# Patient Record
Sex: MALE | Race: WHITE | NOT HISPANIC OR LATINO | Employment: OTHER | URBAN - METROPOLITAN AREA
[De-identification: names, ages, dates, MRNs, and addresses within clinical notes are randomized per-mention and may not be internally consistent; named-entity substitution may affect disease eponyms.]

---

## 2023-11-30 ENCOUNTER — TELEPHONE (OUTPATIENT)
Dept: ADMISSION | Facility: HOSPITAL | Age: 73
End: 2023-11-30
Payer: MEDICARE

## 2023-11-30 DIAGNOSIS — C90.00 MULTIPLE MYELOMA, REMISSION STATUS UNSPECIFIED (MULTI): ICD-10-CM

## 2023-11-30 NOTE — TELEPHONE ENCOUNTER
"Patient was prescribed paxlovid and z pack Wednesday morning.     Patient is scheduled as a new patient for Dr. Mckinnon inquiring if he can keep appt this Friday 12/1/2023 with covid infection.     Per patient spouse \"doesn't have symptoms what so ever.\"   "

## 2023-12-01 ENCOUNTER — APPOINTMENT (OUTPATIENT)
Dept: HEMATOLOGY/ONCOLOGY | Facility: HOSPITAL | Age: 73
End: 2023-12-01
Payer: MEDICARE

## 2023-12-04 ENCOUNTER — TELEPHONE (OUTPATIENT)
Dept: HEMATOLOGY/ONCOLOGY | Facility: HOSPITAL | Age: 73
End: 2023-12-04
Payer: MEDICARE

## 2023-12-04 DIAGNOSIS — C90.00 MULTIPLE MYELOMA, REMISSION STATUS UNSPECIFIED (MULTI): ICD-10-CM

## 2023-12-04 NOTE — TELEPHONE ENCOUNTER
Call returned to patient in regards to rescheduling missed appt. Friday 12/1/23. Patient has been rescheduled for Friday 1/19/23 at 11am and was advised to get labs drawn 1 week prior to visit. Patient will have labs drawn at [facility]. Appointment Information will be mailed to patient. Patient verbalized no further needs noted and was made aware to call office with any additional questions.

## 2023-12-07 PROBLEM — Z86.79 ELECTRICAL ISOLATION OF LEFT ATRIAL APPENDAGE AFTER CARDIAC ABLATION PROCEDURE FOR ATRIAL FIBRILLATION: Status: ACTIVE | Noted: 2023-12-07

## 2023-12-07 PROBLEM — Z94.84 STEM CELLS TRANSPLANT STATUS (MULTI): Status: ACTIVE | Noted: 2023-12-07

## 2023-12-07 PROBLEM — I51.9 LEFT VENTRICULAR DYSFUNCTION: Status: ACTIVE | Noted: 2023-12-07

## 2023-12-07 PROBLEM — C90.00 MULTIPLE MYELOMA (MULTI): Status: ACTIVE | Noted: 2023-12-07

## 2023-12-07 PROBLEM — I48.19 PERSISTENT ATRIAL FIBRILLATION (MULTI): Status: ACTIVE | Noted: 2023-12-07

## 2023-12-07 PROBLEM — Z98.890 ELECTRICAL ISOLATION OF LEFT ATRIAL APPENDAGE AFTER CARDIAC ABLATION PROCEDURE FOR ATRIAL FIBRILLATION: Status: ACTIVE | Noted: 2023-12-07

## 2023-12-07 RX ORDER — POMALIDOMIDE 2 MG/1
CAPSULE ORAL
COMMUNITY

## 2023-12-07 RX ORDER — CHOLECALCIFEROL (VITAMIN D3) 50 MCG
1 TABLET ORAL EVERY OTHER DAY
COMMUNITY

## 2023-12-07 RX ORDER — DEXAMETHASONE 4 MG/1
5 TABLET ORAL
COMMUNITY

## 2023-12-07 RX ORDER — PNV NO.95/FERROUS FUM/FOLIC AC 28MG-0.8MG
1 TABLET ORAL EVERY OTHER DAY
COMMUNITY

## 2023-12-07 RX ORDER — ACYCLOVIR 400 MG/1
1 TABLET ORAL 2 TIMES DAILY
COMMUNITY

## 2023-12-07 RX ORDER — OMEPRAZOLE 20 MG/1
20 CAPSULE, DELAYED RELEASE ORAL DAILY
COMMUNITY

## 2023-12-12 ENCOUNTER — OFFICE VISIT (OUTPATIENT)
Dept: CARDIOLOGY | Facility: CLINIC | Age: 73
End: 2023-12-12
Payer: MEDICARE

## 2023-12-12 VITALS
BODY MASS INDEX: 27.6 KG/M2 | HEIGHT: 75 IN | WEIGHT: 222 LBS | DIASTOLIC BLOOD PRESSURE: 72 MMHG | HEART RATE: 80 BPM | SYSTOLIC BLOOD PRESSURE: 118 MMHG

## 2023-12-12 DIAGNOSIS — I48.19 PERSISTENT ATRIAL FIBRILLATION (MULTI): ICD-10-CM

## 2023-12-12 DIAGNOSIS — I51.9 LEFT VENTRICULAR DYSFUNCTION: ICD-10-CM

## 2023-12-12 DIAGNOSIS — E66.3 OVERWEIGHT (BMI 25.0-29.9): ICD-10-CM

## 2023-12-12 DIAGNOSIS — Z98.890 ELECTRICAL ISOLATION OF LEFT ATRIAL APPENDAGE AFTER CARDIAC ABLATION PROCEDURE FOR ATRIAL FIBRILLATION: Primary | ICD-10-CM

## 2023-12-12 DIAGNOSIS — Z86.79 ELECTRICAL ISOLATION OF LEFT ATRIAL APPENDAGE AFTER CARDIAC ABLATION PROCEDURE FOR ATRIAL FIBRILLATION: Primary | ICD-10-CM

## 2023-12-12 PROCEDURE — 1036F TOBACCO NON-USER: CPT | Performed by: INTERNAL MEDICINE

## 2023-12-12 PROCEDURE — 99214 OFFICE O/P EST MOD 30 MIN: CPT | Performed by: INTERNAL MEDICINE

## 2023-12-12 PROCEDURE — 1159F MED LIST DOCD IN RCRD: CPT | Performed by: INTERNAL MEDICINE

## 2023-12-12 RX ORDER — WARFARIN 6 MG/1
6 TABLET ORAL
COMMUNITY

## 2023-12-12 RX ORDER — AMOXICILLIN 250 MG
1 CAPSULE ORAL
COMMUNITY

## 2023-12-12 RX ORDER — ACETAMINOPHEN 500 MG
500 TABLET ORAL EVERY 6 HOURS PRN
COMMUNITY

## 2023-12-12 RX ORDER — MULTIVITAMIN/IRON/FOLIC ACID 18MG-0.4MG
1 TABLET ORAL EVERY OTHER DAY
COMMUNITY
Start: 2023-02-20

## 2023-12-12 RX ORDER — ALBUTEROL SULFATE 90 UG/1
2 AEROSOL, METERED RESPIRATORY (INHALATION) AS NEEDED
COMMUNITY
Start: 2023-06-23

## 2023-12-12 NOTE — LETTER
"December 12, 2023     Jose Salcido MD  [address]  [facility] [address]    Patient: Satish Hammer   YOB: 1950   Date of Visit: 12/12/2023       Dear Dr. Jose Salcido MD:    Thank you for referring Satish Hammer to me for evaluation. Below are my notes for this consultation.  If you have questions, please do not hesitate to call me. I look forward to following your patient along with you.       Sincerely,     Parvez Johnson MD      CC: No Recipients  ______________________________________________________________________________________    Subjective   Satish Hammer is a 73 y.o. male       Chief Complaint    Annual Exam          HPI     Patient returns in follow-up of problems as noted.  He is doing well.  Despite persistent and/or chronic atrial fibrillation he denies any angina CHF or arrhythmia symptomatology and has had no decrement in his aerobic capacity over the last year.  He understands that his previous ablation of atrial fibrillation ultimately failed but he is content with his current condition because his aerobic tolerance is good and he has no complaints or symptoms.    Previously he had some left ventricular dysfunction on echo but it is minimal and asymptomatic and because of this we do not feel that changes or adjustments in therapy are necessary.    In light of all the above we suggest continued therapy as before without change and follow-up next year        Review of Systems   All other systems reviewed and are negative.         Visit Vitals  Ht 1.905 m (6' 3\")   Wt 101 kg (222 lb)   BMI 27.75 kg/m²   Smoking Status Never   BSA 2.31 m²        Objective   Physical Exam  Constitutional:       Appearance: Normal appearance. He is normal weight.   HENT:      Nose: Nose normal.   Neck:      Vascular: No carotid bruit.   Cardiovascular:      Rate and Rhythm: Normal rate.      Pulses: Normal pulses.      Heart sounds: Normal heart sounds.   Pulmonary:      Effort: Pulmonary " effort is normal.   Abdominal:      General: Bowel sounds are normal.      Palpations: Abdomen is soft.   Genitourinary:     Rectum: Normal.   Musculoskeletal:         General: Normal range of motion.      Cervical back: Normal range of motion.      Right lower leg: No edema.      Left lower leg: No edema.   Skin:     General: Skin is warm and dry.   Neurological:      General: No focal deficit present.      Mental Status: He is alert.   Psychiatric:         Mood and Affect: Mood normal.         Behavior: Behavior normal.         Thought Content: Thought content normal.         Judgment: Judgment normal.         Current Medications    Current Outpatient Medications:   •  acetaminophen (Tylenol) 500 mg tablet, Take 1 tablet (500 mg) by mouth every 6 hours if needed for mild pain (1 - 3)., Disp: , Rfl:   •  acyclovir (Zovirax) 400 mg tablet, Take 1 tablet (400 mg) by mouth 2 times a day., Disp: , Rfl:   •  albuterol 90 mcg/actuation inhaler, Inhale 2 puffs if needed., Disp: , Rfl:   •  b complex 0.4 mg tablet, 1 tablet every other day., Disp: , Rfl:   •  calcium carbonate-vitamin D3 (Oscal-500) 500 mg-10 mcg (400 unit) tablet, Take 1 tablet by mouth every other day., Disp: , Rfl:   •  cholecalciferol (Vitamin D-3) 50 MCG (2000 UT) tablet, Take 1 tablet (2,000 Units) by mouth every other day., Disp: , Rfl:   •  dexAMETHasone (Decadron) 4 mg tablet, Take 5 tablets (20 mg) by mouth. Take 5 tablets by mouth 1 hour before treatment, Disp: , Rfl:   •  FILGRASTIM INJ, every 30 (thirty) days., Disp: , Rfl:   •  fish oil concentrate (Omega-3) 120-180 mg capsule, Take 1 capsule (1 g) by mouth once daily., Disp: , Rfl:   •  omeprazole (PriLOSEC) 20 mg DR capsule, Take 1 capsule (20 mg) by mouth once daily., Disp: , Rfl:   •  Pomalyst 2 mg capsule, Take by mouth.  Take one tablet by mouth daily. 3 weeks on and 1 week off, Disp: , Rfl:   •  sennosides-docusate sodium (Jayne-Colace) 8.6-50 mg tablet, Take 1 tablet by mouth once  daily., Disp: , Rfl:   •  warfarin (Coumadin) 6 mg tablet, Take 1 tablet (6 mg) by mouth. Take as directed by Dr. Salcido, Disp: , Rfl:   •  ZINC GLUCONATE ORAL, 140 mg once daily., Disp: , Rfl:                      Assessment/Plan   1. Electrical isolation of left atrial appendage after cardiac ablation procedure for atrial fibrillation  With brief success with none recurrence of atrial fibrillation.  The patient has no desire to undergo repeat ablation    2. Left ventricular dysfunction  Mild in nature and not necessitating restoration of maintenance of sinus rhythm or other adjustments in medical therapy    3. Persistent atrial fibrillation (CMS/HCC)  Asymptomatic well-tolerated and no impairment of aerobic capacity.  Risk is mitigated by chronic warfarin therapy    4. Overweight (BMI 25.0-29.9)  The merits of diet and weight loss were advocated

## 2023-12-12 NOTE — PROGRESS NOTES
"Subjective   Satish Hammer is a 73 y.o. male       Chief Complaint    Annual Exam          HPI     Patient returns in follow-up of problems as noted.  He is doing well.  Despite persistent and/or chronic atrial fibrillation he denies any angina CHF or arrhythmia symptomatology and has had no decrement in his aerobic capacity over the last year.  He understands that his previous ablation of atrial fibrillation ultimately failed but he is content with his current condition because his aerobic tolerance is good and he has no complaints or symptoms.    Previously he had some left ventricular dysfunction on echo but it is minimal and asymptomatic and because of this we do not feel that changes or adjustments in therapy are necessary.    In light of all the above we suggest continued therapy as before without change and follow-up next year        Review of Systems   All other systems reviewed and are negative.         Visit Vitals  Ht 1.905 m (6' 3\")   Wt 101 kg (222 lb)   BMI 27.75 kg/m²   Smoking Status Never   BSA 2.31 m²        Objective   Physical Exam  Constitutional:       Appearance: Normal appearance. He is normal weight.   HENT:      Nose: Nose normal.   Neck:      Vascular: No carotid bruit.   Cardiovascular:      Rate and Rhythm: Normal rate.      Pulses: Normal pulses.      Heart sounds: Normal heart sounds.   Pulmonary:      Effort: Pulmonary effort is normal.   Abdominal:      General: Bowel sounds are normal.      Palpations: Abdomen is soft.   Genitourinary:     Rectum: Normal.   Musculoskeletal:         General: Normal range of motion.      Cervical back: Normal range of motion.      Right lower leg: No edema.      Left lower leg: No edema.   Skin:     General: Skin is warm and dry.   Neurological:      General: No focal deficit present.      Mental Status: He is alert.   Psychiatric:         Mood and Affect: Mood normal.         Behavior: Behavior normal.         Thought Content: Thought content normal.   "       Judgment: Judgment normal.         Current Medications    Current Outpatient Medications:     acetaminophen (Tylenol) 500 mg tablet, Take 1 tablet (500 mg) by mouth every 6 hours if needed for mild pain (1 - 3)., Disp: , Rfl:     acyclovir (Zovirax) 400 mg tablet, Take 1 tablet (400 mg) by mouth 2 times a day., Disp: , Rfl:     albuterol 90 mcg/actuation inhaler, Inhale 2 puffs if needed., Disp: , Rfl:     b complex 0.4 mg tablet, 1 tablet every other day., Disp: , Rfl:     calcium carbonate-vitamin D3 (Oscal-500) 500 mg-10 mcg (400 unit) tablet, Take 1 tablet by mouth every other day., Disp: , Rfl:     cholecalciferol (Vitamin D-3) 50 MCG (2000 UT) tablet, Take 1 tablet (2,000 Units) by mouth every other day., Disp: , Rfl:     dexAMETHasone (Decadron) 4 mg tablet, Take 5 tablets (20 mg) by mouth. Take 5 tablets by mouth 1 hour before treatment, Disp: , Rfl:     FILGRASTIM INJ, every 30 (thirty) days., Disp: , Rfl:     fish oil concentrate (Omega-3) 120-180 mg capsule, Take 1 capsule (1 g) by mouth once daily., Disp: , Rfl:     omeprazole (PriLOSEC) 20 mg DR capsule, Take 1 capsule (20 mg) by mouth once daily., Disp: , Rfl:     Pomalyst 2 mg capsule, Take by mouth.  Take one tablet by mouth daily. 3 weeks on and 1 week off, Disp: , Rfl:     sennosides-docusate sodium (Jayne-Colace) 8.6-50 mg tablet, Take 1 tablet by mouth once daily., Disp: , Rfl:     warfarin (Coumadin) 6 mg tablet, Take 1 tablet (6 mg) by mouth. Take as directed by Dr. Salcido, Disp: , Rfl:     ZINC GLUCONATE ORAL, 140 mg once daily., Disp: , Rfl:                      Assessment/Plan   1. Electrical isolation of left atrial appendage after cardiac ablation procedure for atrial fibrillation  With brief success with none recurrence of atrial fibrillation.  The patient has no desire to undergo repeat ablation    2. Left ventricular dysfunction  Mild in nature and not necessitating restoration of maintenance of sinus rhythm or other adjustments in  medical therapy    3. Persistent atrial fibrillation (CMS/HCC)  Asymptomatic well-tolerated and no impairment of aerobic capacity.  Risk is mitigated by chronic warfarin therapy    4. Overweight (BMI 25.0-29.9)  The merits of diet and weight loss were advocated

## 2024-01-08 ENCOUNTER — LAB (OUTPATIENT)
Dept: LAB | Facility: LAB | Age: 74
End: 2024-01-08
Payer: MEDICARE

## 2024-01-08 DIAGNOSIS — C90.00 MULTIPLE MYELOMA, REMISSION STATUS UNSPECIFIED (MULTI): ICD-10-CM

## 2024-01-08 LAB
ALBUMIN SERPL BCP-MCNC: 4.2 G/DL (ref 3.4–5)
ALP SERPL-CCNC: 60 U/L (ref 33–136)
ALT SERPL W P-5'-P-CCNC: 15 U/L (ref 10–52)
ANION GAP SERPL CALC-SCNC: 7 MMOL/L (ref 10–20)
AST SERPL W P-5'-P-CCNC: 15 U/L (ref 9–39)
B2 MICROGLOB SERPL-MCNC: 2.4 MG/L (ref 0.7–2.2)
BASOPHILS # BLD AUTO: 0.03 X10*3/UL (ref 0–0.1)
BASOPHILS NFR BLD AUTO: 0.8 %
BILIRUB SERPL-MCNC: 0.7 MG/DL (ref 0–1.2)
BUN SERPL-MCNC: 12 MG/DL (ref 6–23)
CALCIUM SERPL-MCNC: 8.7 MG/DL (ref 8.6–10.3)
CHLORIDE SERPL-SCNC: 104 MMOL/L (ref 98–107)
CO2 SERPL-SCNC: 33 MMOL/L (ref 21–32)
CREAT SERPL-MCNC: 0.96 MG/DL (ref 0.5–1.3)
EGFRCR SERPLBLD CKD-EPI 2021: 83 ML/MIN/1.73M*2
EOSINOPHIL # BLD AUTO: 0.07 X10*3/UL (ref 0–0.4)
EOSINOPHIL NFR BLD AUTO: 1.8 %
ERYTHROCYTE [DISTWIDTH] IN BLOOD BY AUTOMATED COUNT: 17.5 % (ref 11.5–14.5)
GLUCOSE SERPL-MCNC: 103 MG/DL (ref 74–99)
HCT VFR BLD AUTO: 42.8 % (ref 41–52)
HGB BLD-MCNC: 14 G/DL (ref 13.5–17.5)
IGA SERPL-MCNC: 33 MG/DL (ref 70–400)
IGG SERPL-MCNC: 671 MG/DL (ref 700–1600)
IGM SERPL-MCNC: 6 MG/DL (ref 40–230)
IMM GRANULOCYTES # BLD AUTO: 0.02 X10*3/UL (ref 0–0.5)
IMM GRANULOCYTES NFR BLD AUTO: 0.5 % (ref 0–0.9)
LDH SERPL L TO P-CCNC: 100 U/L (ref 84–246)
LYMPHOCYTES # BLD AUTO: 1.41 X10*3/UL (ref 0.8–3)
LYMPHOCYTES NFR BLD AUTO: 36.2 %
MCH RBC QN AUTO: 30.4 PG (ref 26–34)
MCHC RBC AUTO-ENTMCNC: 32.7 G/DL (ref 32–36)
MCV RBC AUTO: 93 FL (ref 80–100)
MONOCYTES # BLD AUTO: 0.65 X10*3/UL (ref 0.05–0.8)
MONOCYTES NFR BLD AUTO: 16.7 %
NEUTROPHILS # BLD AUTO: 1.72 X10*3/UL (ref 1.6–5.5)
NEUTROPHILS NFR BLD AUTO: 44 %
NRBC BLD-RTO: 0 /100 WBCS (ref 0–0)
PLATELET # BLD AUTO: 147 X10*3/UL (ref 150–450)
POTASSIUM SERPL-SCNC: 4.4 MMOL/L (ref 3.5–5.3)
PROT SERPL-MCNC: 6 G/DL (ref 6.4–8.2)
PROT SERPL-MCNC: 6 G/DL (ref 6.4–8.2)
RBC # BLD AUTO: 4.61 X10*6/UL (ref 4.5–5.9)
SODIUM SERPL-SCNC: 140 MMOL/L (ref 136–145)
WBC # BLD AUTO: 3.9 X10*3/UL (ref 4.4–11.3)

## 2024-01-08 PROCEDURE — 82784 ASSAY IGA/IGD/IGG/IGM EACH: CPT

## 2024-01-08 PROCEDURE — 83521 IG LIGHT CHAINS FREE EACH: CPT

## 2024-01-08 PROCEDURE — 36415 COLL VENOUS BLD VENIPUNCTURE: CPT

## 2024-01-08 PROCEDURE — 84165 PROTEIN E-PHORESIS SERUM: CPT | Performed by: STUDENT IN AN ORGANIZED HEALTH CARE EDUCATION/TRAINING PROGRAM

## 2024-01-08 PROCEDURE — 84165 PROTEIN E-PHORESIS SERUM: CPT

## 2024-01-08 PROCEDURE — 80053 COMPREHEN METABOLIC PANEL: CPT

## 2024-01-08 PROCEDURE — 85025 COMPLETE CBC W/AUTO DIFF WBC: CPT

## 2024-01-08 PROCEDURE — 84155 ASSAY OF PROTEIN SERUM: CPT

## 2024-01-08 PROCEDURE — 82232 ASSAY OF BETA-2 PROTEIN: CPT

## 2024-01-08 PROCEDURE — 83615 LACTATE (LD) (LDH) ENZYME: CPT

## 2024-01-09 LAB
KAPPA LC SERPL-MCNC: 0.81 MG/DL (ref 0.33–1.94)
KAPPA LC/LAMBDA SER: 1.35 {RATIO} (ref 0.26–1.65)
LAMBDA LC SERPL-MCNC: 0.6 MG/DL (ref 0.57–2.63)

## 2024-01-10 LAB
ALBUMIN: 3.9 G/DL (ref 3.4–5)
ALPHA 1 GLOBULIN: 0.2 G/DL (ref 0.2–0.6)
ALPHA 2 GLOBULIN: 0.6 G/DL (ref 0.4–1.1)
BETA GLOBULIN: 0.7 G/DL (ref 0.5–1.2)
GAMMA GLOBULIN: 0.5 G/DL (ref 0.5–1.4)
M-PROTEIN 1: 0.2 G/DL
PATH REVIEW-SERUM PROTEIN ELECTROPHORESIS: ABNORMAL
PROTEIN ELECTROPHORESIS COMMENT: ABNORMAL

## 2024-01-19 ENCOUNTER — TELEMEDICINE (OUTPATIENT)
Dept: HEMATOLOGY/ONCOLOGY | Facility: HOSPITAL | Age: 74
End: 2024-01-19
Payer: MEDICARE

## 2024-01-19 ENCOUNTER — TELEPHONE (OUTPATIENT)
Dept: ADMISSION | Facility: HOSPITAL | Age: 74
End: 2024-01-19
Payer: MEDICARE

## 2024-01-19 DIAGNOSIS — C90.00 MULTIPLE MYELOMA (MULTI): ICD-10-CM

## 2024-01-19 DIAGNOSIS — C90.00 MULTIPLE MYELOMA, REMISSION STATUS UNSPECIFIED (MULTI): ICD-10-CM

## 2024-01-19 DIAGNOSIS — C90.01 MULTIPLE MYELOMA IN REMISSION (MULTI): Primary | ICD-10-CM

## 2024-01-19 PROCEDURE — 99442 PR PHYS/QHP TELEPHONE EVALUATION 11-20 MIN: CPT | Performed by: STUDENT IN AN ORGANIZED HEALTH CARE EDUCATION/TRAINING PROGRAM

## 2024-01-19 NOTE — PROGRESS NOTES
History   Patient ID: Satish Hammer is a 73 y.o. male.  Interval Notes:  Unchanged from last visit. This is a phone visit, which the patient agreed to. Doing well on therapy for myeloma. Transplant in 2017 but progressed recently. Did no collect enough cells for 2 transplants. Had BMB recently by Dr. Lopez within [facility]  MM s/p autologous transplant, then maintenance revlimid       Assessment/Plan   Diagnoses and all orders for this visit:  Multiple myeloma in remission (CMS/HCC)  -     Tumor Board Request; Future  Multiple myeloma (CMS/HCC)  -     Referral to Hematology and Oncology  Multiple myeloma, remission status unspecified (CMS/HCC)  -     Clinic Appointment Request [facility] FAUSTIN (main campus visit)  - will review BMB at tumor board and discuss plan of care  20 minutes were spent reviewing the patients chart, discussing symptoms, reviewing lab results with patient and going over the plan of care

## 2024-01-19 NOTE — TELEPHONE ENCOUNTER
The patient called in, wanted to make sure before he drove 2 hours to [facility] that Dr. Mckinnon was in today.  [facility]. Patient also agreeable to phone visit, would like contacted at [phone]     Scheduling, please switch this to a phone visit

## 2024-01-25 ENCOUNTER — TUMOR BOARD CONFERENCE (OUTPATIENT)
Dept: HEMATOLOGY/ONCOLOGY | Facility: HOSPITAL | Age: 74
End: 2024-01-25
Payer: MEDICARE

## 2024-11-13 NOTE — TUMOR BOARD NOTE
TUMOR BOARD DISCUSSION SUMMARY    PRESENTER: Dr. Fatemeh Lopez    DIAGNOSIS: Multiple myeloma    SUMMARY/PRESENTATION: Satish Hammer is a 74 y.o. male patient who presents with myeloma dx 2016 s/p autologous transplant 01/2017 has had a number of subsequent treatments including pomalidomide, eotuzumab, MRD in 12/22 daratumumab added 3/23 MRD by clonoseq 1388 ,  current evaluation BM < 5% myeloma cells by IHC, MRD by clonoseq up to 2591, tolerating current treatment fine.     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      Information reviewed: Treatment Plan Review    Radiology:     Pathology:     RECOMMENDATIONS: Follow-up with PET CT. Consider CAR-T evaluation vs coninuation of current therapy           Disclaimer     [facility] tumor board recommendations represent the consensus opinion of physicians present at a weekly patient care conference. The treating [facility] physician is not always present, and many of the physicians formulating the recommendation have not personally seen or examined the patient under discussion. It is understood that the treating [facility] physician considers the expertise of the Tumor Board Recommendation in formulating his/her plan for the patient. However, in many situations, based on individualized patient considerations, a different plan is determined by the treating physician to be the optimal medical management.     Scribe Attestation  By signing my name below, Jasvir AHUMADA Scribe   attest that this documentation has been prepared under the direction and in the presence of MALIGNANT HEME TUMOR BOARD.

## 2024-11-14 ENCOUNTER — TUMOR BOARD CONFERENCE (OUTPATIENT)
Dept: HEMATOLOGY/ONCOLOGY | Facility: HOSPITAL | Age: 74
End: 2024-11-14
Payer: MEDICARE

## 2024-12-11 ENCOUNTER — APPOINTMENT (OUTPATIENT)
Dept: CARDIOLOGY | Facility: CLINIC | Age: 74
End: 2024-12-11
Payer: MEDICARE

## 2024-12-11 ENCOUNTER — OFFICE VISIT (OUTPATIENT)
Dept: CARDIOLOGY | Facility: CLINIC | Age: 74
End: 2024-12-11
Payer: MEDICARE

## 2024-12-11 VITALS
HEART RATE: 78 BPM | DIASTOLIC BLOOD PRESSURE: 76 MMHG | WEIGHT: 219 LBS | HEIGHT: 75 IN | SYSTOLIC BLOOD PRESSURE: 118 MMHG | BODY MASS INDEX: 27.23 KG/M2

## 2024-12-11 DIAGNOSIS — I51.9 LEFT VENTRICULAR DYSFUNCTION: Primary | ICD-10-CM

## 2024-12-11 DIAGNOSIS — Z79.01 LONG TERM CURRENT USE OF ANTICOAGULANT THERAPY: ICD-10-CM

## 2024-12-11 DIAGNOSIS — E66.3 OVERWEIGHT (BMI 25.0-29.9): ICD-10-CM

## 2024-12-11 DIAGNOSIS — I82.409 ACUTE EMBOLISM AND THROMBOSIS OF DEEP VEIN OF LOWER EXTREMITY, UNSPECIFIED LATERALITY (MULTI): ICD-10-CM

## 2024-12-11 DIAGNOSIS — I48.19 PERSISTENT ATRIAL FIBRILLATION (MULTI): ICD-10-CM

## 2024-12-11 DIAGNOSIS — I25.10 CORONARY ARTERY DISEASE INVOLVING NATIVE CORONARY ARTERY OF NATIVE HEART WITHOUT ANGINA PECTORIS: ICD-10-CM

## 2024-12-11 PROBLEM — Z86.79 ELECTRICAL ISOLATION OF LEFT ATRIAL APPENDAGE AFTER CARDIAC ABLATION PROCEDURE FOR ATRIAL FIBRILLATION: Status: RESOLVED | Noted: 2023-12-07 | Resolved: 2024-12-11

## 2024-12-11 PROBLEM — Z98.890 ELECTRICAL ISOLATION OF LEFT ATRIAL APPENDAGE AFTER CARDIAC ABLATION PROCEDURE FOR ATRIAL FIBRILLATION: Status: RESOLVED | Noted: 2023-12-07 | Resolved: 2024-12-11

## 2024-12-11 PROCEDURE — 1157F ADVNC CARE PLAN IN RCRD: CPT | Performed by: NURSE PRACTITIONER

## 2024-12-11 PROCEDURE — 1160F RVW MEDS BY RX/DR IN RCRD: CPT | Performed by: NURSE PRACTITIONER

## 2024-12-11 PROCEDURE — 1159F MED LIST DOCD IN RCRD: CPT | Performed by: NURSE PRACTITIONER

## 2024-12-11 PROCEDURE — 1036F TOBACCO NON-USER: CPT | Performed by: NURSE PRACTITIONER

## 2024-12-11 PROCEDURE — 99213 OFFICE O/P EST LOW 20 MIN: CPT | Performed by: NURSE PRACTITIONER

## 2024-12-11 RX ORDER — VIT C/E/ZN/COPPR/LUTEIN/ZEAXAN 250MG-90MG
400 CAPSULE ORAL EVERY OTHER DAY
COMMUNITY

## 2024-12-11 RX ORDER — BACLOFEN 20 MG
1 TABLET ORAL EVERY OTHER DAY
COMMUNITY

## 2024-12-11 ASSESSMENT — ENCOUNTER SYMPTOMS
NEAR-SYNCOPE: 0
PND: 0
SYNCOPE: 0
DYSPNEA ON EXERTION: 0
PALPITATIONS: 0
IRREGULAR HEARTBEAT: 0
ORTHOPNEA: 0

## 2024-12-11 NOTE — PROGRESS NOTES
"Chief Complaint  \"Doing fine\"     Reason for Visit  Annual follow-up  Patient presents to the office today for outpatient follow-up for A-fib, anticoagulation, cardiomyopathy.  Last evaluated in clinic by Dr. Zhang December 2023    Presents today ambulatory with steady gait.  Accompanied by spouse  Patient denies any hospitalizations or significant changes to interval medical history since last office follow-up.     History of Present Illness   Patient is a very pleasant 74-year-old gentleman who presents to the office today with no voiced cardiovascular complaints.  He remains aerobically active where he cuts firewood on a daily basis, utilizes the [employer] and walks his dog.  He reports very rare shortness of breath, no orthopnea or PND.  Denies any prior edema or treatment for decompensated heart failure.  He denies exertional symptoms.    In regards to atrial fibrillation is likely now persistent and he denies any symptoms.  Heart rates have been optimal at home on no AV serena blocking agents.  Remains anticoagulated.    Patient reports that overall has no complaint(s) of chest pain, chest pressure/discomfort, claudication, dyspnea, exertional chest pressure/discomfort, and fatigue    Daily activity:    Greater than 4 METS  Denies any change in exercise capacity or functional tolerance since last office visit.    The importance of primary prevention reviewed:  HTN: None  HLD: Due to moderate LAD disease would benefit from statin, lipid status is unknown  DM: Denies  Smoker: Denies  BMI:  Reviewed the merits of healthy lifestyle choices on overall cardiovascular health.    Lengthy discussion regarding cardiovascular history specifically in regards to cardiomyopathy.  Initially cardiomyopathy was felt due to A-fib and he had improvement with restoration of normal sinus rhythm.  He has not likely been out of rhythm greater than 2 years, fortunately has no symptoms.  Will repeat echocardiogram and if warranted he " "will return back to clinic for optimization of treatment.    Would recommend that he resumes a statin, he believes lab work been completed by PCP and will request.    Fall screening reveals an occurrence over the course of the last year. Events were reviewed in detail with the patient and due to accidental.      Review of Systems   Cardiovascular:  Negative for chest pain, dyspnea on exertion, irregular heartbeat, leg swelling, near-syncope, orthopnea, palpitations, paroxysmal nocturnal dyspnea and syncope.        Visit Vitals  /76 (BP Location: Right arm, Patient Position: Sitting)   Pulse 78   Ht 1.905 m (6' 3\")   Wt 99.3 kg (219 lb)   BMI 27.37 kg/m²   Smoking Status Never   BSA 2.29 m²     Physical Exam  Vitals and nursing note reviewed.   Constitutional:       Appearance: Normal appearance.   Cardiovascular:      Rate and Rhythm: Normal rate. Rhythm irregularly irregular.      Heart sounds: Normal heart sounds.   Pulmonary:      Effort: Pulmonary effort is normal.      Breath sounds: Normal breath sounds.   Musculoskeletal:      Cervical back: Full passive range of motion without pain.      Right lower leg: No edema.      Left lower leg: No edema.   Skin:     General: Skin is cool.   Neurological:      Mental Status: He is alert and oriented to person, place, and time.   Psychiatric:         Attention and Perception: Attention normal.         Mood and Affect: Mood normal.         Behavior: Behavior is cooperative.        Allergies   Allergen Reactions    Zoledronic Acid Nausea/vomiting     Current Outpatient Medications   Medication Instructions    acetaminophen (TYLENOL) 1,000 mg, Every 6 hours PRN    acyclovir (Zovirax) 400 mg tablet 1 tablet, 2 times daily    albuterol 90 mcg/actuation inhaler 2 puffs, As needed    calcium carbonate-vitamin D3 (Oscal-500) 500 mg-10 mcg (400 unit) tablet 1 tablet, Every other day    cholecalciferol (Vitamin D-3) 50 MCG (2000 UT) tablet 1 tablet, Every other day    " DARATUMUMAB IV 1 Dose, Every 30 days    dexAMETHasone (Decadron) 4 mg tablet 3 tablets    filgrastim-sndz (ZARXIO INJ) 1 Dose, See admin instructions    fish oil concentrate (Omega-3) 120-180 mg capsule 1 capsule, Daily    magnesium oxide 500 mg magnesium tablet 1 tablet, Every other day    omeprazole (PRILOSEC) 20 mg, Daily    Pomalyst 2 mg capsule Take by mouth.  Take one tablet by mouth daily. 3 weeks on and 1 week off    sennosides-docusate sodium (Jayne-Colace) 8.6-50 mg tablet 1 tablet, Daily RT    vitamin E 400 Units, Every other day    warfarin (COUMADIN) 6 mg    ZINC GLUCONATE ORAL 50 mg, Daily      Assessment:    Cardiac and Vasculature  In 2016 was evaluated by [facility] prior to stem cell transplant.    Noted to be in atrial fib.  Dec 2016 BABAR LVEF 35-40%  Dec 2016 cMRI:  LVEF 34%  BRUCE severe    Dec 2016 cardiac cath  pLAD 50%  CX < 30%  RCA 30%    To Dr. Martinez with Dec 2016 PVI ablation and tikosyn with NSR.  BABAR at that time:  LVEF 35%  BRUCE severe  AR t/mild  Large PFO    Jan 2017 BABAR (after restoration SR)  LVEF 45%    July 2017 TTE  LVEF 50%  LA mod/severe  Low normal RV function  RA mod/severe    In 2019 was referred to Dr. Zhang for local management.  He eventually was taken off of Tikosyn due to failure.  He has somewhat persistent A-fib -likely chronic documented January 2023 office visit.  Today in office exam consistent with A-fib, rate controlled on no AV serena blocking agents.    He has CHADS VASc score 3 but has been chronically anticoagulated on Coumadin due to September 2016 DVT.        Left ventricular dysfunction  NICM HF improved EF 50% Jan 2017 TTE  FC I    GDMT:   On record review I do not see any past treatment.    Plan to repeat echocardiogram-discussed with patient and wife the importance of GDMT and they will return to the office if warranted to initiate.    Persistent atrial fibrillation (Multi)  Notified around 2016  Treatment strategy was rhythm control due to  cardiomyopathy and in 2016 had PVI ablation and was on Tikosyn for period of time.  It looks like for the last 24 months is likely had chronic persistent atrial fibrillation with rate control on no AV serena blocking agents.    Long term current use of anticoagulant therapy  CHADS VASc 3 chronically anticoagulated on Coumadin  Managed by PCP  Denies bleeding diatheses    Overweight (BMI 25.0-29.9)  Reviewed the merits of healthy lifestyle choices on overall cardiovascular health.      CAD (coronary artery disease)  December 2016 cardiac cath  Proximal LAD 50%  Circumflex and RCA less than 30%    Current daily activity greater than 4 METS without concerning symptoms    Reports at one point was on a statin   Would benefit from twice weekly aspirin    Plan:     Through informed decision making process incorporating patients unique circumstances, the following treatment plan will be initiated:    1.  Prescription drug management of cardiovascular medication for efficacy, adherence to treatment, side effect assessment and polypharmacy. Current treatment clinically warranted and to continue without modifications.    2.  Echo (cardiomyopathy)    3. Return for follow-up; in the interim, contact the office if new symptoms arise.  Dr. Roxy Day  MSN, APRN-CNP, PMHNP-BC  [facility]    Please excuse any errors in grammar or translation related to this dictation. Voice recognition software was utilized to prepare this document.

## 2024-12-11 NOTE — ASSESSMENT & PLAN NOTE
NICM HF improved EF 50% Jan 2017 TTE  FC I    GDMT:   On record review I do not see any past treatment.    Plan to repeat echocardiogram-discussed with patient and wife the importance of GDMT and they will return to the office if warranted to initiate.

## 2024-12-11 NOTE — LETTER
"December 11, 2024     Jose Salcido MD  [address]  [facility] [address]    Patient: Satish Hammer   YOB: 1950   Date of Visit: 12/11/2024       Dear Dr. Jose Salcido MD:    Thank you for referring Satish Hammer to me for evaluation. Below are my notes for this consultation.  If you have questions, please do not hesitate to call me. I look forward to following your patient along with you.       Sincerely,     Bharti Day, APRN-CNP      CC: No Recipients  ______________________________________________________________________________________    Chief Complaint  \"Doing fine\"     Reason for Visit  Annual follow-up  Patient presents to the office today for outpatient follow-up for A-fib, anticoagulation, cardiomyopathy.  Last evaluated in clinic by Dr. Zhang December 2023    Presents today ambulatory with steady gait.  Accompanied by spouse  Patient denies any hospitalizations or significant changes to interval medical history since last office follow-up.     History of Present Illness   Patient is a very pleasant 74-year-old gentleman who presents to the office today with no voiced cardiovascular complaints.  He remains aerobically active where he cuts firewood on a daily basis, utilizes the chainsaw and walks his dog.  He reports very rare shortness of breath, no orthopnea or PND.  Denies any prior edema or treatment for decompensated heart failure.  He denies exertional symptoms.    In regards to atrial fibrillation is likely now persistent and he denies any symptoms.  Heart rates have been optimal at home on no AV serena blocking agents.  Remains anticoagulated.    Patient reports that overall has no complaint(s) of chest pain, chest pressure/discomfort, claudication, dyspnea, exertional chest pressure/discomfort, and fatigue    Daily activity:    Greater than 4 METS  Denies any change in exercise capacity or functional tolerance since last office visit.    The importance of primary " "prevention reviewed:  HTN: None  HLD: Due to moderate LAD disease would benefit from statin, lipid status is unknown  DM: Denies  Smoker: Denies  BMI:  Reviewed the merits of healthy lifestyle choices on overall cardiovascular health.    Lengthy discussion regarding cardiovascular history specifically in regards to cardiomyopathy.  Initially cardiomyopathy was felt due to A-fib and he had improvement with restoration of normal sinus rhythm.  He has not likely been out of rhythm greater than 2 years, fortunately has no symptoms.  Will repeat echocardiogram and if warranted he will return back to clinic for optimization of treatment.    Would recommend that he resumes a statin, he believes lab work been completed by PCP and will request.    Fall screening reveals an occurrence over the course of the last year. Events were reviewed in detail with the patient and due to accidental.      Review of Systems   Cardiovascular:  Negative for chest pain, dyspnea on exertion, irregular heartbeat, leg swelling, near-syncope, orthopnea, palpitations, paroxysmal nocturnal dyspnea and syncope.        Visit Vitals  /76 (BP Location: Right arm, Patient Position: Sitting)   Pulse 78   Ht 1.905 m (6' 3\")   Wt 99.3 kg (219 lb)   BMI 27.37 kg/m²   Smoking Status Never   BSA 2.29 m²     Physical Exam  Vitals and nursing note reviewed.   Constitutional:       Appearance: Normal appearance.   Cardiovascular:      Rate and Rhythm: Normal rate. Rhythm irregularly irregular.      Heart sounds: Normal heart sounds.   Pulmonary:      Effort: Pulmonary effort is normal.      Breath sounds: Normal breath sounds.   Musculoskeletal:      Cervical back: Full passive range of motion without pain.      Right lower leg: No edema.      Left lower leg: No edema.   Skin:     General: Skin is cool.   Neurological:      Mental Status: He is alert and oriented to person, place, and time.   Psychiatric:         Attention and Perception: Attention normal. "         Mood and Affect: Mood normal.         Behavior: Behavior is cooperative.        Allergies   Allergen Reactions   • Zoledronic Acid Nausea/vomiting     Current Outpatient Medications   Medication Instructions   • acetaminophen (TYLENOL) 1,000 mg, Every 6 hours PRN   • acyclovir (Zovirax) 400 mg tablet 1 tablet, 2 times daily   • albuterol 90 mcg/actuation inhaler 2 puffs, As needed   • calcium carbonate-vitamin D3 (Oscal-500) 500 mg-10 mcg (400 unit) tablet 1 tablet, Every other day   • cholecalciferol (Vitamin D-3) 50 MCG (2000 UT) tablet 1 tablet, Every other day   • DARATUMUMAB IV 1 Dose, Every 30 days   • dexAMETHasone (Decadron) 4 mg tablet 3 tablets   • filgrastim-sndz (ZARXIO INJ) 1 Dose, See admin instructions   • fish oil concentrate (Omega-3) 120-180 mg capsule 1 capsule, Daily   • magnesium oxide 500 mg magnesium tablet 1 tablet, Every other day   • omeprazole (PRILOSEC) 20 mg, Daily   • Pomalyst 2 mg capsule Take by mouth.  Take one tablet by mouth daily. 3 weeks on and 1 week off   • sennosides-docusate sodium (Jayne-Colace) 8.6-50 mg tablet 1 tablet, Daily RT   • vitamin E 400 Units, Every other day   • warfarin (COUMADIN) 6 mg   • ZINC GLUCONATE ORAL 50 mg, Daily      Assessment:    Cardiac and Vasculature  In 2016 was evaluated by [facility] prior to stem cell transplant.    Noted to be in atrial fib.  Dec 2016 BABAR LVEF 35-40%  Dec 2016 cMRI:  LVEF 34%  BRUCE severe    Dec 2016 cardiac cath  pLAD 50%  CX < 30%  RCA 30%    To Dr. Martinez with Dec 2016 PVI ablation and tikosyn with NSR.  BABAR at that time:  LVEF 35%  BRUCE severe  AR t/mild  Large PFO    Jan 2017 BABAR (after restoration SR)  LVEF 45%    July 2017 TTE  LVEF 50%  LA mod/severe  Low normal RV function  RA mod/severe    In 2019 was referred to Dr. Zhang for local management.  He eventually was taken off of Tikosyn due to failure.  He has somewhat persistent A-fib -likely chronic documented January 2023 office visit.  Today in office  exam consistent with A-fib, rate controlled on no AV serena blocking agents.    He has CHADS VASc score 3 but has been chronically anticoagulated on Coumadin due to September 2016 DVT.        Left ventricular dysfunction  NICM HF improved EF 50% Jan 2017 TTE  FC I    GDMT:   On record review I do not see any past treatment.    Plan to repeat echocardiogram-discussed with patient and wife the importance of GDMT and they will return to the office if warranted to initiate.    Persistent atrial fibrillation (Multi)  Notified around 2016  Treatment strategy was rhythm control due to cardiomyopathy and in 2016 had PVI ablation and was on Tikosyn for period of time.  It looks like for the last 24 months is likely had chronic persistent atrial fibrillation with rate control on no AV serena blocking agents.    Long term current use of anticoagulant therapy  CHADS VASc 3 chronically anticoagulated on Coumadin  Managed by PCP  Denies bleeding diatheses    Overweight (BMI 25.0-29.9)  Reviewed the merits of healthy lifestyle choices on overall cardiovascular health.      CAD (coronary artery disease)  December 2016 cardiac cath  Proximal LAD 50%  Circumflex and RCA less than 30%    Current daily activity greater than 4 METS without concerning symptoms    Reports at one point was on a statin   Would benefit from twice weekly aspirin    Plan:     Through informed decision making process incorporating patients unique circumstances, the following treatment plan will be initiated:    1.  Prescription drug management of cardiovascular medication for efficacy, adherence to treatment, side effect assessment and polypharmacy. Current treatment clinically warranted and to continue without modifications.    2.  Echo (cardiomyopathy)    3. Return for follow-up; in the interim, contact the office if new symptoms arise.  Dr. Ramsey [facility]     Bhatri Day  MSN, APRN-CNP, PMHNP-BC  [facility]    Please excuse any errors in  grammar or translation related to this dictation. Voice recognition software was utilized to prepare this document.

## 2024-12-11 NOTE — PATIENT INSTRUCTIONS
Please bring all medicines, vitamins, and herbal supplements with you when you come to the office.    Prescriptions will not be filled unless you are compliant with your follow up appointments or have a follow up appointment scheduled as per instruction of your physician. Refills should be requested at the time of your visit.     Fall Prevention Education Given     PLAN:   Through informed decision making process incorporating patients unique circumstances, the following treatment plan will be initiated:    1.  Prescription drug management of cardiovascular medication for efficacy, adherence to treatment, side effect assessment and polypharmacy. Current treatment clinically warranted and to continue without modifications.    2.  Echo (cardiomyopathy)    3. Return for follow-up; in the interim, contact the office if new symptoms arise.  Dr. Ramsey annual         Ways to Help Prevent Falls at Home    Quick Tips   ? Ask for help if you need it. Most people want to help!   ? Get up slowly after sitting or laying down   ? Wear a medical alert device or keep cell phone in your pocket   ? Use night lights, especially areas near a bathroom   ? Keep the items you use often within reach on a small stool or end table   ? Use an assistive device such as walker or cane, as directed by provider/physical therapy   ? Use a non-slip mat and grab bars in your bathroom. Look for home health sections for best options     Other Areas to Focus On   ? Exercise and nutrition: Regular exercise or taking a falls prevention class are great ways improve strength and balance. Don’t forget to stay hydrated and bring a snack!   ? Medicine side effects: Some medicines can make you sleepy or dizzy, which could cause a fall. Ask your healthcare provider about the side effects your medicines could cause. Be sure to let them know if you take any vitamins or supplements as well.   ? Tripping hazards: Remove items you could trip on, such as loose mats,  rugs, cords, and clutter. Wear closed toe shoes with rubber soles.   ? Health and wellness: Get regular checkups with your healthcare provider, plus routine vision and hearing screenings. Talk with your healthcare provider about:   o Your medicines and the possible side effects - bring them in a bag if that is easier!   o Problems with balance or feeling dizzy   o Ways to promote bone health, such as Vitamin D and calcium supplements   o Questions or concerns about falling     *Ask your healthcare team if you have questions     ©[facility], 2022

## 2024-12-11 NOTE — ASSESSMENT & PLAN NOTE
December 2016 cardiac cath  Proximal LAD 50%  Circumflex and RCA less than 30%    Current daily activity greater than 4 METS without concerning symptoms    Reports at one point was on a statin   Would benefit from twice weekly aspirin

## 2024-12-11 NOTE — ASSESSMENT & PLAN NOTE
In 2016 was evaluated by [facility] prior to stem cell transplant.    Noted to be in atrial fib.  Dec 2016 BABAR LVEF 35-40%  Dec 2016 cMRI:  LVEF 34%  BRUCE severe    Dec 2016 cardiac cath  pLAD 50%  CX < 30%  RCA 30%    To Dr. Martinez with Dec 2016 PVI ablation and tikosyn with NSR.  BABAR at that time:  LVEF 35%  BRUCE severe  AR t/mild  Large PFO    Jan 2017 BABAR (after restoration SR)  LVEF 45%    July 2017 TTE  LVEF 50%  LA mod/severe  Low normal RV function  RA mod/severe    In 2019 was referred to Dr. Zhang for local management.  He eventually was taken off of Tikosyn due to failure.  He has somewhat persistent A-fib -likely chronic documented January 2023 office visit.  Today in office exam consistent with A-fib, rate controlled on no AV serena blocking agents.    He has CHADS VASc score 3 but has been chronically anticoagulated on Coumadin due to September 2016 DVT.

## 2024-12-11 NOTE — ASSESSMENT & PLAN NOTE
Notified around 2016  Treatment strategy was rhythm control due to cardiomyopathy and in 2016 had PVI ablation and was on Tikosyn for period of time.  It looks like for the last 24 months is likely had chronic persistent atrial fibrillation with rate control on no AV serena blocking agents.

## 2025-01-16 ENCOUNTER — HOSPITAL ENCOUNTER (OUTPATIENT)
Dept: CARDIOLOGY | Facility: CLINIC | Age: 75
Discharge: HOME | End: 2025-01-16
Payer: MEDICARE

## 2025-01-16 VITALS
BODY MASS INDEX: 27.23 KG/M2 | HEIGHT: 75 IN | SYSTOLIC BLOOD PRESSURE: 120 MMHG | WEIGHT: 219 LBS | DIASTOLIC BLOOD PRESSURE: 70 MMHG

## 2025-01-16 DIAGNOSIS — I48.19 PERSISTENT ATRIAL FIBRILLATION (MULTI): ICD-10-CM

## 2025-01-16 DIAGNOSIS — I51.9 LEFT VENTRICULAR DYSFUNCTION: ICD-10-CM

## 2025-01-16 LAB
AORTIC VALVE MEAN GRADIENT: 3 MMHG
AORTIC VALVE PEAK VELOCITY: 1.1 M/S
AV PEAK GRADIENT: 5 MMHG
AVA (PEAK VEL): 3.24 CM2
AVA (VTI): 3.31 CM2
EJECTION FRACTION APICAL 4 CHAMBER: 40.7
EJECTION FRACTION: 48 %
LEFT VENTRICLE INTERNAL DIMENSION DIASTOLE: 5.98 CM (ref 3.5–6)
LEFT VENTRICULAR OUTFLOW TRACT DIAMETER: 2.7 CM
LV EJECTION FRACTION BIPLANE: 46 %
RIGHT VENTRICLE PEAK SYSTOLIC PRESSURE: 31.9 MMHG

## 2025-01-16 PROCEDURE — 93306 TTE W/DOPPLER COMPLETE: CPT | Performed by: INTERNAL MEDICINE

## 2025-01-16 PROCEDURE — 93306 TTE W/DOPPLER COMPLETE: CPT

## 2025-01-20 ENCOUNTER — TELEPHONE (OUTPATIENT)
Dept: CARDIOLOGY | Facility: CLINIC | Age: 75
End: 2025-01-20
Payer: MEDICARE

## 2025-01-20 NOTE — TELEPHONE ENCOUNTER
----- Message from Bharti Day sent at 1/20/2025  9:47 AM EST -----  Please let him know heart muscle function remains 45-50% (prior 50%).  Atrial fibrillation is not causing any concerns with heart muscle function.  No changes needed at this time.  ----- Message -----  From: Marlen, Syngo - Cardiology Results In  Sent: 1/16/2025   4:39 PM EST  To: DELVIS Piedra-CNP

## 2025-01-20 NOTE — TELEPHONE ENCOUNTER
Result Communication    Resulted Orders   Transthoracic Echo Complete   Result Value Ref Range    AV mn grad 3 mmHg    AV pk kayla 1.10 m/s    LV Biplane EF 46 %    LVOT diam 2.70 cm    LV EF 48 %    RVSP 31.9 mmHg    LVIDd 5.98 cm    Aortic Valve Area by Continuity of Peak Velocity 3.24 cm2    AV pk grad 5 mmHg    Aortic Valve Area by Continuity of VTI 3.31 cm2    LV A4C EF 40.7     Narrative                   [facility]  [address], [address], [place] [address]          Tel [phone] Fax [phone]    TRANSTHORACIC ECHOCARDIOGRAM REPORT    Patient Name:        MAVIS MCNALLYDELANO Marcano Physician:    [id number] Rico Ramsey MD,                                                                  [facility]  Study Date:          1/16/2025            Ordering Provider:    [id number] SHIKHA RODRIGUEZ  MRN/PID:             [id number]             Fellow:  Accession#:          [id number]         Nurse:  Date of Birth/Age:   1950 / 74 years  Sonographer:          Lorena Persaud RDCS, RVT  Gender Assigned at                       Additional Staff:  Birth:  Height:              190.50 cm            Admit Date:  Weight:              99.34 kg             Admission Status:  BSA / BMI:           2.28 m2 / 27.37      Department Location:  [facility]                       kg/m2                                      [facility] [place]  Blood Pressure: 120 /70 mmHg    Study Type:    TRANSTHORACIC ECHO (TTE) COMPLETE  Diagnosis/ICD: Other persistent AFib-I48.19; Heart disease, unspecified-I51.9  Indication:    s/p Ablation-12/2016, CAD, Cardiomyopathy, Multiple Myeloma-s/p                 Transplant 1/2017  CPT Codes:     Echo Complete w Full [id number]   Study Detail: The following Echo studies were performed: 2D, M-Mode, Doppler  and                color flow.       PHYSICIAN INTERPRETATION:  Left Ventricle: Left ventricular ejection fraction is mildly decreased, by visual estimate at 45-50%. There are no regional wall motion abnormalities. The left ventricular cavity size is normal. There is mild increased septal and normal posterior left ventricular wall thickness. Left ventricular diastolic filling was indeterminate. Atrial fibrillation was noted throughout.  Left Atrium: The left atrium is mildly dilated. Small PFO with left-to-right shunt is noted.  Right Ventricle: The right ventricle is normal in size. There is normal right ventricular global systolic function.  Right Atrium: The right atrium is mildly dilated.  Aortic Valve: The aortic valve is trileaflet. The aortic valve dimensionless index is 0.58. There is trace aortic valve regurgitation. The peak instantaneous gradient of the aortic valve is 5 mmHg. The mean gradient of the aortic valve is 3 mmHg.  Mitral Valve: The mitral valve is mildly thickened. The peak instantaneous gradient of the mitral valve is 4 mmHg. There is mild mitral valve regurgitation.  Tricuspid Valve: The tricuspid valve is structurally normal. There is trace tricuspid regurgitation.  Pulmonic Valve: The pulmonic valve is structurally normal. There is trace pulmonic valve regurgitation.  Pericardium: No pericardial effusion noted.  Aorta: The aortic root is normal.  Pulmonary Artery: The Doppler estimated pulmonary artery diastolic pressure is 16.9 mmHg.  Systemic Veins: The inferior vena cava appears mildly dilated.       CONCLUSIONS:   1. Left ventricular ejection fraction is mildly decreased, by visual estimate at 45-50%.   2. Left ventricular diastolic filling was indeterminate.   3. Atrial fibrillation was noted throughout.   4. There is normal right ventricular global systolic function.   5. Small PFO with left-to-right shunt is noted.   6. Mild mitral valve regurgitation.   7. The inferior vena cava  appears mildly dilated.    QUANTITATIVE DATA SUMMARY:     2D MEASUREMENTS:            Normal Ranges:  Ao Root d:       4.10 cm    (2.0-3.7cm)  LAs:             5.10 cm    (2.7-4.0cm)  RVIDd:           3.26 cm    (0.9-3.6cm)  IVSd:            1.13 cm    (0.6-1.1cm)  LVPWd:           0.94 cm    (0.6-1.1cm)  LVIDd:           5.98 cm    (3.9-5.9cm)  LVIDs:           4.99 cm  LV Mass Index:   112.8 g/m2  LV % FS          16.6 %       LV SYSTOLIC FUNCTION BY 2D PLANIMETRY (MOD):                       Normal Ranges:  EF-A4C View:    41 % (>=55%)  EF-A2C View:    49 %  EF-Biplane:     46 %  EF-Visual:      48 %  LV EF Reported: 48 %       LV DIASTOLIC FUNCTION:          Normal Ranges:  MV Peak E:             0.81 m/s (0.7-1.2 m/s)       MITRAL VALVE:          Normal Ranges:  MV Vmax:      1.02 m/s (<=1.3m/s)  MV peak P.2 mmHg (<5mmHg)  MV mean P.3 mmHg (<48mmHg)       MITRAL INSUFFICIENCY:             Normal Ranges:  MR Vmax:              455.33 cm/s  dP/dt:                4393 mmHg/s (>1200mmHg/sec)       AORTIC VALVE:                     Normal Ranges:  AoV Vmax:                1.10 m/s (<=1.7m/s)  AoV Peak P.8 mmHg (<20mmHg)  AoV Mean PG:             3.0 mmHg (1.7-11.5mmHg)  LVOT Max Yemi:            0.62 m/s (<=1.1m/s)  AoV VTI:                 21.10 cm (18-25cm)  LVOT VTI:                12.20 cm  LVOT Diameter:           2.70 cm  (1.8-2.4cm)  AoV Area, VTI:           3.31 cm2 (2.5-5.5cm2)  AoV Area,Vmax:           3.24 cm2 (2.5-4.5cm2)  AoV Dimensionless Index: 0.58       AORTIC INSUFFICIENCY:  AI Vmax:       2.90 m/s  AI Half-time:  667 msec  AI Decel Rate: 116.54 cm/s2       TRICUSPID VALVE/RVSP:          Normal Ranges:  Peak TR Velocity:     2.69 m/s  RV Syst Pressure:     32 mmHg  (< 30mmHg)       PULMONIC VALVE:           Normal Ranges:  PV Max Yemi:     0.4 m/s   (0.6-0.9m/s)  PV Max P.8 mmHg  PIEDV:          1.87 m/s  PADP:           16.9 mmHg       [id number] Rico Ramsey MD,  FACC  Electronically signed on 1/16/2025 at 4:39:30 PM         ** Final **         10:33 AM      Results were successfully communicated with the patient and they acknowledged their understanding.

## 2025-12-11 ENCOUNTER — APPOINTMENT (OUTPATIENT)
Dept: CARDIOLOGY | Facility: CLINIC | Age: 75
End: 2025-12-11
Payer: MEDICARE